# Patient Record
(demographics unavailable — no encounter records)

---

## 2025-03-06 NOTE — PHYSICAL EXAM
[General Appearance - Well Developed] : well developed [General Appearance - Well Nourished] : well nourished [] : no respiratory distress [Exaggerated Use Of Accessory Muscles For Inspiration] : no accessory muscle use [Abnormal Walk] : normal gait [Oriented To Time, Place, And Person] : oriented to person, place, and time [Impaired Insight] : insight and judgment were intact [Normal Conjunctiva] : the conjunctiva exhibited no abnormalities [Neck Appearance] : the appearance of the neck was normal

## 2025-03-06 NOTE — REVIEW OF SYSTEMS
[EDS: ESS=____] : daytime somnolence: ESS=[unfilled] [Depression] : depression [Anxious] : anxious [Difficulty Initiating Sleep] : difficulty falling asleep [Difficulty Maintaining Sleep] : difficulty maintaining sleep [Chronic Insomnia] : chronic  insomnia [Negative] : Neurologic [Snoring] : no snoring [Witnessed Apneas] : no witnessed apnea [Lower Extremity Discomfort] : no lower extremity discomfort [Unusual Sleep Behavior] : no unusual sleep behavior [Hypersomnolence] : not sleeping much more than usual [Cataplexy] :  no cataplexy

## 2025-03-06 NOTE — END OF VISIT
[FreeTextEntry3] :   I, Vicki Rivera MD, personally performed the evaluation and management (E/M) services for this patient. That E/M includes conducting the clinically appropriate initial history &/or exam, assessing all conditions, and establishing the plan of care. I have also independently reviewed the medical records and imaging at the time of this office visit, and discussed the above mentioned images with interpretations with the patient. Today, FRANCIS Rivera was here to participate in the visit & follow plan of care established by me.

## 2025-03-06 NOTE — HISTORY OF PRESENT ILLNESS
[FreeTextEntry1] : The patient is a 59-year-old F with PMHx of Depression/Anxiety, binge-eating disorder, psoriasis, chronic insomnia, here today to establish care for insomnia. Has had on/off insomnia for years. Stop using cocaine 10/2023. Since then, developed sleep maintenance insomnia. Anxiety has worsened as well. Follows with psychiatry and has a therapist. Does some CBTi with therapist. Also has significant stress related to finances, will need to get a job soon but worried about sleepiness from insomnia. Currently gets sleepy around 10PM. Goes to bed around midnight. Awakens for the day at 4AM. Does awaken after about 2 hours of sleep. Usually gets out of bed, tries to read, or sometimes watches tv. Tries to avoid napping. In the past, tried to taper off of Abilify but developed RLS so was resumed on this medication.   Meds:  Gabapentin 600 mg TID (anxiety) Clonidine TID (anxiety)  Bupropion  Abilify  Lexapro (started two weeks ago) Topamax (binge-eating disorder) Naltrexone  Meds trialed for insomnia: Trazodone  Ambien Doxepin Lunesta  [ESS] : 4

## 2025-03-06 NOTE — ASSESSMENT
[FreeTextEntry1] : Reviewed HST 11/2024: AHI <5   The patient is a 59-year-old F with PMHx of Depression/Anxiety, binge-eating disorder, psoriasis, chronic insomnia, here today to establish care for insomnia which acutely worsened 10/2023 after cessation of cocaine use. Since then, developed sleep maintenance insomnia. Follows with psychiatry for anxiety/depression and binge-eating disorder. Does some CBTi with her therapist. Currently goes to bed at 12AM and awakens at 4AM. Advised to go to bed with natural circadian rhythm at 10PM. Given referral for decided CBTi therapist. Advised to take all medications in the morning as some of them may be very alerting. She just started Lexapro 2 weeks ago; if there is a benefit for anxiety discussed tapering off of gabapentin in the future. Reinforced stimulus control techniques, advised to try listening to boring podcast instead of books or TV.   Follow-up in 2 months.